# Patient Record
Sex: FEMALE | Race: WHITE | NOT HISPANIC OR LATINO | Employment: PART TIME | URBAN - NONMETROPOLITAN AREA
[De-identification: names, ages, dates, MRNs, and addresses within clinical notes are randomized per-mention and may not be internally consistent; named-entity substitution may affect disease eponyms.]

---
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
## 2022-11-04 ENCOUNTER — OFFICE VISIT (OUTPATIENT)
Dept: OBSTETRICS AND GYNECOLOGY | Facility: CLINIC | Age: 22
End: 2022-11-04

## 2022-11-04 VITALS
HEIGHT: 60 IN | WEIGHT: 98.4 LBS | DIASTOLIC BLOOD PRESSURE: 58 MMHG | BODY MASS INDEX: 19.32 KG/M2 | SYSTOLIC BLOOD PRESSURE: 110 MMHG

## 2022-11-04 DIAGNOSIS — N94.6 DYSMENORRHEA: Primary | ICD-10-CM

## 2022-11-04 DIAGNOSIS — N83.8 OVARIAN MASS, LEFT: ICD-10-CM

## 2022-11-04 PROCEDURE — 99213 OFFICE O/P EST LOW 20 MIN: CPT | Performed by: PHYSICIAN ASSISTANT

## 2022-11-04 NOTE — PROGRESS NOTES
Subjective   Chief Complaint   Patient presents with   • Menstrual Problem     Patient complains of severe pain during menstrual cycle and thinks she could have ovarian cysts. Patient states they found a benign left ovarian mass.       Sugar Sequeira is a 22 y.o. year old  presenting to be seen for dysmenorrhea and left ovarian mass  Patient had scheduled appt here to be seen for dysmenorrhea but subsequently was seen at [facility] and had CT scan done for pelvic pain. CT scan showed left adnexa mass and then transvaginal pelvic ultrasound done 10- noted a 7 cm left ovarian mass. Report states mass is not simple but looks benign. Patient has copy of report on her phone which is reviewed.   She is not currently sexually active. She stopped birth control patch several months ago.  Cycles are regular q 28 days with 4 day flow but cycles very painful. Does not experience pelvic pain except when on menses.   Since spring of 2021 menses especially painful. Has issues with chronic constipation also and has been on Linzess for 2 weeks  LMP 10/15/2022       History reviewed. No pertinent past medical history.   No current outpatient medications on file.   No Known Allergies   History reviewed. No pertinent surgical history.   Social History     Socioeconomic History   • Marital status: Single   Tobacco Use   • Smoking status: Never   • Smokeless tobacco: Never   Substance and Sexual Activity   • Alcohol use: No   • Drug use: No   • Sexual activity: Not Currently     Partners: Male      Family History   Problem Relation Age of Onset   • No Known Problems Father    • No Known Problems Mother        Review of Systems   Constitutional: Negative for chills, diaphoresis and fever.   Gastrointestinal: Negative for constipation, diarrhea, nausea and vomiting.   Genitourinary: Positive for menstrual problem and pelvic pain. Negative for difficulty urinating and dysuria.           Objective   /58   Ht  "152.4 cm (60\")   Wt 44.6 kg (98 lb 6.4 oz)   LMP 10/15/2022 Comment: States their period lasted 4 days with severe pain  BMI 19.22 kg/m²     Physical Exam  Constitutional:       Appearance: Normal appearance. She is well-developed and well-groomed.   Eyes:      General: Lids are normal.      Extraocular Movements: Extraocular movements intact.      Conjunctiva/sclera: Conjunctivae normal.   Skin:     General: Skin is warm and dry.      Findings: No bruising or lesion.   Neurological:      General: No focal deficit present.      Mental Status: She is alert and oriented to person, place, and time.   Psychiatric:         Attention and Perception: Attention normal.         Mood and Affect: Mood normal.         Speech: Speech normal.         Behavior: Behavior is cooperative.            Result Review :                   Assessment and Plan  Diagnoses and all orders for this visit:    1. Dysmenorrhea (Primary)    2. Ovarian mass, left      Patient Instructions   Follow up 2-3 weeks to repeat TVS here as want to allow a little time to see if cyst changing. Pending follow up ultrasound will discuss further management but if cyst persistent and complex may need surgical removal.               This note was electronically signed.    Kavya Antonio PA-C   November 4, 2022  "

## 2022-11-04 NOTE — PATIENT INSTRUCTIONS
Follow up 2-3 weeks to repeat TVS here as want to allow a little time to see if cyst changing. Pending follow up ultrasound will discuss further management but if cyst persistent and complex may need surgical removal.

## 2022-12-07 ENCOUNTER — HOSPITAL ENCOUNTER (OUTPATIENT)
Facility: HOSPITAL | Age: 22
Setting detail: HOSPITAL OUTPATIENT SURGERY
End: 2022-12-07
Attending: OBSTETRICS & GYNECOLOGY | Admitting: OBSTETRICS & GYNECOLOGY

## 2022-12-07 ENCOUNTER — PREP FOR SURGERY (OUTPATIENT)
Dept: OTHER | Facility: HOSPITAL | Age: 22
End: 2022-12-07

## 2022-12-07 ENCOUNTER — DOCUMENTATION (OUTPATIENT)
Dept: OBSTETRICS AND GYNECOLOGY | Facility: CLINIC | Age: 22
End: 2022-12-07

## 2022-12-07 DIAGNOSIS — R19.00 PELVIC MASS: Primary | ICD-10-CM

## 2022-12-07 RX ORDER — SODIUM CHLORIDE 0.9 % (FLUSH) 0.9 %
3 SYRINGE (ML) INJECTION EVERY 12 HOURS SCHEDULED
Status: CANCELLED | OUTPATIENT
Start: 2022-12-07

## 2022-12-07 RX ORDER — SODIUM CHLORIDE 9 MG/ML
40 INJECTION, SOLUTION INTRAVENOUS AS NEEDED
Status: CANCELLED | OUTPATIENT
Start: 2022-12-07

## 2022-12-07 RX ORDER — SODIUM CHLORIDE 0.9 % (FLUSH) 0.9 %
10 SYRINGE (ML) INJECTION AS NEEDED
Status: CANCELLED | OUTPATIENT
Start: 2022-12-07

## 2022-12-07 NOTE — PROGRESS NOTES
Patient is informed of results of pelvic ultrasound done yesterday.  She has a persistent 7.8 cm complex cystic area appears to be filled with thick echogenic fluid.  This is slightly larger than what was seen on previous CT scan done in October at [facility].  Mass at that time measured 6.3 x 4.5 x 4.2 cm.  It appeared to be in the left hemipelvis.  Pelvic ultrasound done October 18 at [facility] had noted a 7 cm left ovarian mass.  On today's ultrasound the sonographer could not determine for sure which side this originated from.  Bilateral ovaries did have follicles and vascular flow.  There was a small amount of free fluid.  Patient reports that she is not having any significant pelvic pain however she is having issues with bowel movements.  She has seen gastroenterologist recently who feels that her bowel issues are likely related to the pelvic mass.  Recommend diagnostic laparoscopy and removal of mass for definitive diagnosis.  She is advised that there could possibly be removal of ovary but will be as conservative as possible.  Patient voices understanding/agreement.

## 2022-12-13 ENCOUNTER — TELEPHONE (OUTPATIENT)
Dept: OBSTETRICS AND GYNECOLOGY | Facility: CLINIC | Age: 22
End: 2022-12-13

## 2022-12-13 NOTE — TELEPHONE ENCOUNTER
Caller: Sugar Sequeira    Relationship to patient: Self    Best call back number: [phone]    Chief complaint: ADBEXAL MASS    Type of visit: SURGERY    Requested date: 1ST AVAILABLE     If rescheduling, when is the original appointment: 1/3/23    Additional notes:PT CALLED TO SEE IF SURGERY DATE COULD BE MOVED UP, SHE IS IN A LOT OF PAIN.  PLS CALL TO DISCUSS

## 2022-12-20 ENCOUNTER — TELEPHONE (OUTPATIENT)
Dept: PREADMISSION TESTING | Facility: HOSPITAL | Age: 22
End: 2022-12-20

## 2022-12-22 ENCOUNTER — PRE-ADMISSION TESTING (OUTPATIENT)
Dept: PREADMISSION TESTING | Facility: HOSPITAL | Age: 22
End: 2022-12-22

## 2022-12-22 VITALS — BODY MASS INDEX: 19.91 KG/M2 | WEIGHT: 101.4 LBS | HEIGHT: 60 IN

## 2022-12-22 DIAGNOSIS — R19.00 PELVIC MASS: ICD-10-CM

## 2022-12-22 LAB
BASOPHILS # BLD AUTO: 0.05 10*3/MM3 (ref 0–0.2)
BASOPHILS NFR BLD AUTO: 0.9 % (ref 0–1.5)
BILIRUB UR QL STRIP: NEGATIVE
CLARITY UR: CLEAR
COLOR UR: YELLOW
DEPRECATED RDW RBC AUTO: 42 FL (ref 37–54)
EOSINOPHIL # BLD AUTO: 0.19 10*3/MM3 (ref 0–0.4)
EOSINOPHIL NFR BLD AUTO: 3.6 % (ref 0.3–6.2)
ERYTHROCYTE [DISTWIDTH] IN BLOOD BY AUTOMATED COUNT: 13.2 % (ref 12.3–15.4)
GLUCOSE UR STRIP-MCNC: NEGATIVE MG/DL
HCT VFR BLD AUTO: 38.8 % (ref 34–46.6)
HGB BLD-MCNC: 12.6 G/DL (ref 12–15.9)
HGB UR QL STRIP.AUTO: NEGATIVE
IMM GRANULOCYTES # BLD AUTO: 0.01 10*3/MM3 (ref 0–0.05)
IMM GRANULOCYTES NFR BLD AUTO: 0.2 % (ref 0–0.5)
KETONES UR QL STRIP: NEGATIVE
LEUKOCYTE ESTERASE UR QL STRIP.AUTO: NEGATIVE
LYMPHOCYTES # BLD AUTO: 2.48 10*3/MM3 (ref 0.7–3.1)
LYMPHOCYTES NFR BLD AUTO: 46.8 % (ref 19.6–45.3)
MCH RBC QN AUTO: 28.2 PG (ref 26.6–33)
MCHC RBC AUTO-ENTMCNC: 32.5 G/DL (ref 31.5–35.7)
MCV RBC AUTO: 86.8 FL (ref 79–97)
MONOCYTES # BLD AUTO: 0.34 10*3/MM3 (ref 0.1–0.9)
MONOCYTES NFR BLD AUTO: 6.4 % (ref 5–12)
NEUTROPHILS NFR BLD AUTO: 2.23 10*3/MM3 (ref 1.7–7)
NEUTROPHILS NFR BLD AUTO: 42.1 % (ref 42.7–76)
NITRITE UR QL STRIP: NEGATIVE
NRBC BLD AUTO-RTO: 0 /100 WBC (ref 0–0.2)
PH UR STRIP.AUTO: 6.5 [PH] (ref 5–8)
PLATELET # BLD AUTO: 325 10*3/MM3 (ref 140–450)
PMV BLD AUTO: 9.2 FL (ref 6–12)
PROT UR QL STRIP: NEGATIVE
RBC # BLD AUTO: 4.47 10*6/MM3 (ref 3.77–5.28)
SP GR UR STRIP: 1.02 (ref 1–1.03)
UROBILINOGEN UR QL STRIP: NORMAL
WBC NRBC COR # BLD: 5.3 10*3/MM3 (ref 3.4–10.8)

## 2022-12-22 PROCEDURE — 36415 COLL VENOUS BLD VENIPUNCTURE: CPT

## 2022-12-22 PROCEDURE — 81003 URINALYSIS AUTO W/O SCOPE: CPT

## 2022-12-22 PROCEDURE — 85025 COMPLETE CBC W/AUTO DIFF WBC: CPT

## 2022-12-27 ENCOUNTER — TELEPHONE (OUTPATIENT)
Dept: OBSTETRICS AND GYNECOLOGY | Facility: CLINIC | Age: 22
End: 2022-12-27

## 2022-12-27 NOTE — TELEPHONE ENCOUNTER
Caller: Sugar Seqeuira    Relationship: Self    Best call back number: [phone]    What form or medical record are you requesting: MEDICAL RECORDS     Who is requesting this form or medical record from you:     How would you like to receive the form or medical records (pick-up, mail, fax):   If fax, what is the fax number: [phone]    Timeframe paperwork needed: AS SOON AS POSSIBLE    Additional notes: PT IS TRANSFERRING CARE TO  OFFICE

## 2022-12-27 NOTE — TELEPHONE ENCOUNTER
Called patient to inform her that she needs to come on and sign a release of records form. No answer or voice mail set up.

## 2023-01-05 ENCOUNTER — TELEPHONE (OUTPATIENT)
Dept: OBSTETRICS AND GYNECOLOGY | Facility: CLINIC | Age: 23
End: 2023-01-05

## 2023-01-05 ENCOUNTER — OFFICE VISIT (OUTPATIENT)
Dept: OBSTETRICS AND GYNECOLOGY | Facility: CLINIC | Age: 23
End: 2023-01-05
Payer: COMMERCIAL

## 2023-01-05 VITALS
RESPIRATION RATE: 14 BRPM | BODY MASS INDEX: 20.12 KG/M2 | WEIGHT: 103 LBS | SYSTOLIC BLOOD PRESSURE: 112 MMHG | DIASTOLIC BLOOD PRESSURE: 64 MMHG

## 2023-01-05 DIAGNOSIS — N94.6 DYSMENORRHEA: ICD-10-CM

## 2023-01-05 DIAGNOSIS — R10.31 RLQ ABDOMINAL PAIN: ICD-10-CM

## 2023-01-05 DIAGNOSIS — R93.5 ABNORMAL ULTRASOUND OF OVARY: Primary | ICD-10-CM

## 2023-01-05 PROBLEM — Z01.419 WELL WOMAN EXAM: Status: ACTIVE | Noted: 2023-01-05

## 2023-01-05 PROCEDURE — 99214 OFFICE O/P EST MOD 30 MIN: CPT | Performed by: OBSTETRICS & GYNECOLOGY

## 2023-01-05 RX ORDER — RELUGOLIX, ESTRADIOL HEMIHYDRATE, AND NORETHINDRONE ACETATE 40; 1; .5 MG/1; MG/1; MG/1
1 TABLET, FILM COATED ORAL DAILY
Qty: 84 TABLET | Refills: 4 | Status: SHIPPED | OUTPATIENT
Start: 2023-01-05

## 2023-01-05 NOTE — PROGRESS NOTES
Subjective   Chief Complaint   Patient presents with   • Gynecologic Exam       Sugar Sequeira is a 22 y.o. year old .  Patient's last menstrual period was 2022 (approximate).  She comes in as a new patient transferring care from [facility].  She comes in related to a cystic pelvic mass seen at outside imaging.  In early October she was having issues with pain and constipation.  CAT scan was done showing moderate stool burden as well as a cystic adnexal mass.  She is had subsequent ultrasounds done showing persistence of this mass.  Her most recent ultrasound was in early December showing about a 7 cm mass highly suspicious for an endometrioma.  She had no hormonal treatment yet attempted to resolve this.    Pain used to be more left-sided than right but now her right is the only side it really hurts.  Her constipation does seem to be improving with time    She is able to work and go to school other than when she is on her cycle.  Her pain is debilitating and unless she takes pain medicine she cannot do much with that.    The following portions of the patient's history were reviewed and updated as appropriate:current medications, allergies, past family history, past medical history, past social history and past surgical history    Social History    Tobacco Use      Smoking status: Never      Smokeless tobacco: Never         Objective   /64   Resp 14   Wt 46.7 kg (103 lb)   LMP 2022 (Approximate)   BMI 20.12 kg/m²     Lab Review   See HPI    Imaging   See HPI       Assessment   1. Suspected right ovarian endometrioma with pelvic pain and dysmenorrhea  2. History of constipation suspected aggravated by anticipated ovarian endometrioma/endometriosis     Plan   1. For now would like to try avoiding surgery and see if we can help resolve the symptom with empiric treatment is endometriosis  2. The importance of keeping all planned follow-up and taking all medications as prescribed  was emphasized.  3. Follow up about 8 weeks after starting therapy to see if we are symptomatically improving  4. Explained that this could take at least 6 months of ongoing therapy for measurable difference in her endometrioma sonographically    New Medications Ordered This Visit   Medications   • Myfembree 40-1-0.5 MG tablet     Sig: Take 1 tablet by mouth Daily.     Dispense:  84 tablet     Refill:  4          This note was electronically signed.    Carlos Mckenzie M.D.  January 5, 2023    Total time spent today with Sugar  was 45-59 minutes (level 4) - pathophysiology of her presenting problem along with plans for any diagnositc work-up and treatment.    Part of this note may be an electronic transcription/translation of spoken language to printed text using the Dragon Dictation System.

## 2023-01-05 NOTE — TELEPHONE ENCOUNTER
Provider: DR. DE LA CRUZ    Caller: JAQUELINE CALDWELL    Relationship to Patient: SELF    Phone Number: [phone]    Reason for Call: PATIENT WAS SEEN TODAY 1/5/23. SHE STATED THAT  DR. DE LA CRUZ TOLD HER SHE HAS ENDOMETRIOSIS. SHE WOULD LIKE TO KNOW WITH THE DIAGNOSIS WILL SHE HAVE FERTILITY ISSUES IN THE FUTURE. IF SO, SHE WOULD LIKE TO KNOW THE STEPS TO TAKE NOW TO PREVENT INFERTILITY. PATIENT CAN BE CALLED [phone]    When was the patient last seen: 1/5/23

## 2023-01-06 NOTE — TELEPHONE ENCOUNTER
There is no way to know for certain.  Surgery does not necessarily improve the chances.  Medication does not necessarily improve the chances.  If she does have endometriosis there is definitely an increased likelihood of fertility issues but at this point the most important thing to consider is control of her symptoms.  I still think going the direction of medication is her best option.

## 2023-04-18 ENCOUNTER — OFFICE VISIT (OUTPATIENT)
Dept: OBSTETRICS AND GYNECOLOGY | Facility: CLINIC | Age: 23
End: 2023-04-18
Payer: COMMERCIAL

## 2023-04-18 VITALS — BODY MASS INDEX: 19.33 KG/M2 | WEIGHT: 99 LBS | RESPIRATION RATE: 14 BRPM

## 2023-04-18 DIAGNOSIS — N83.8 OVARIAN MASS, LEFT: ICD-10-CM

## 2023-04-18 DIAGNOSIS — R93.5 ABNORMAL ULTRASOUND OF OVARY: Primary | ICD-10-CM

## 2023-04-18 DIAGNOSIS — R10.31 RLQ ABDOMINAL PAIN: ICD-10-CM

## 2023-04-18 DIAGNOSIS — N94.6 DYSMENORRHEA: ICD-10-CM

## 2023-04-18 PROCEDURE — 99213 OFFICE O/P EST LOW 20 MIN: CPT | Performed by: OBSTETRICS & GYNECOLOGY

## 2023-04-18 NOTE — PROGRESS NOTES
Subjective   Chief Complaint   Patient presents with   • Medication Reaction       Sugar Sequeira is a 22 y.o. year old .  Patient's last menstrual period was 2023 (approximate).  She comes in follow-up.  She is using the Myfembree and feels much better.  Almost no pain anymore.  The constipation is better as well.  Having no menses or spotting.  No side effects with the medication    The following portions of the patient's history were reviewed and updated as appropriate:current medications and allergies    Social History    Tobacco Use      Smoking status: Never      Smokeless tobacco: Never         Objective   Resp 14   Wt 44.9 kg (99 lb)   LMP 2023 (Approximate)   BMI 19.33 kg/m²     Lab Review   No data reviewed    Imaging   No data reviewed        Assessment   1. Pelvic pain with presumed endometrioma - better with medication     Plan   1. Continue same plan  2. Recheck U/S in ~ 3-4 months  3. The importance of keeping all planned follow-up and taking all medications as prescribed was emphasized.  4. Follow up at the time of f/u U/S           This note was electronically signed.    Carlos Mckenzie M.D.  2023    Total time spent today with Sugar  was 20-29 minutes (level 3) - pathophysiology of her presenting problem along with plans for any diagnositc work-up and treatment.    Part of this note may be an electronic transcription/translation of spoken language to printed text using the Dragon Dictation System.

## 2023-07-18 PROBLEM — N80.129 ENDOMETRIOMA OF OVARY: Status: ACTIVE | Noted: 2022-12-07

## 2023-07-24 ENCOUNTER — TELEPHONE (OUTPATIENT)
Dept: OBSTETRICS AND GYNECOLOGY | Facility: CLINIC | Age: 23
End: 2023-07-24
Payer: COMMERCIAL

## 2023-07-24 NOTE — TELEPHONE ENCOUNTER
DOROTHY    Pt states on Friday night after having Sex she had extreme LLQ pain . Pt took Ibuprofen and after 40 min pain eased up. States since then she has had sex that afterwards that has still been causing her pain but it is not as painful

## 2023-07-24 NOTE — TELEPHONE ENCOUNTER
Patient is not pregnant.  Pain this weekend.  No pain with IC until this weekend.  Patient is on myfembree.   Patient has endometriosis.  LMP no menses since starting Myfembree.

## 2023-07-25 NOTE — TELEPHONE ENCOUNTER
Would let her know that as not an uncommon symptom with endometriosis.  It may take time for this to get better.  It may not get better at all with the Myfembree therapy but will take some time to see.

## 2023-10-19 ENCOUNTER — OFFICE VISIT (OUTPATIENT)
Dept: OBSTETRICS AND GYNECOLOGY | Facility: CLINIC | Age: 23
End: 2023-10-19
Payer: COMMERCIAL

## 2023-10-19 VITALS
DIASTOLIC BLOOD PRESSURE: 64 MMHG | BODY MASS INDEX: 19.73 KG/M2 | WEIGHT: 101 LBS | SYSTOLIC BLOOD PRESSURE: 104 MMHG | RESPIRATION RATE: 14 BRPM

## 2023-10-19 DIAGNOSIS — N80.129 ENDOMETRIOMA OF OVARY: Primary | ICD-10-CM

## 2023-10-19 PROCEDURE — 99214 OFFICE O/P EST MOD 30 MIN: CPT | Performed by: OBSTETRICS & GYNECOLOGY

## 2023-10-19 NOTE — PROGRESS NOTES
Subjective   Chief Complaint   Patient presents with    Follow-up     Us today, f/u endometriomas     Sugar Sequeira is a 23 y.o. year old  who comes to review her recent testing and discuss next steps.  She remains on Myfembree.  She is having very few symptoms at all.  She is having no menstrual cycles.  Ultrasound today shows persistence of the bilateral ovarian cysts, sonographically most consistent with endometriomas.  The size of the cyst is no greater on either right or left side and may be even slightly smaller.    She is taking a prenatal vitamin already.  They are contemplating getting pregnant and would like to know when that might be safe         Objective   /64 (BP Location: Right arm, Patient Position: Sitting, Cuff Size: Adult)   Resp 14   Wt 45.8 kg (101 lb)   LMP 01/15/2023 (Within Weeks) Comment: Sometime in 2023  BMI 19.73 kg/m²     Lab Review   No data reviewed    Imaging   See HPI       Assessment   Asymptomatic bilateral abnormal adnexa most consistent with bilateral ovarian endometriomas.  She is on Myfembree and is doing well with that  Preconceptional on folic acid     Plan   Continue with Myfembree  Recheck by ultrasound in 3 to 4 months time unless symptoms worsen  If she chooses to get off the Myfembree in an effort to get pregnant I would support that.  If she has not conceived within 6 to 9 months off Myfembree I think we can readdress whether surgical intervention may be needed  The use of condoms for pregnancy prevention was emphasized as long as she is taking Myfembree.   The importance of keeping all planned follow-up and taking all medications as prescribed was emphasized.  Have also reviewed that should she get pregnant she is at increased risk for ectopic pregnancies.  As such if she were to test positive for pregnancy we would need to see her soon to confirm intrauterine location  Follow up PRN             This note was electronically signed.    Carlos  CHANDLER Mckenzie M.D.  October 19, 2023    Part of this note may be an electronic transcription/translation of spoken language to printed text using the Dragon Dictation System.

## 2023-10-24 ENCOUNTER — TELEPHONE (OUTPATIENT)
Dept: OBSTETRICS AND GYNECOLOGY | Facility: CLINIC | Age: 23
End: 2023-10-24
Payer: COMMERCIAL

## 2023-10-24 NOTE — TELEPHONE ENCOUNTER
Caller: Sugar Sequeira    Relationship: Self    Best call back number: [phone]    What form or medical record are you requesting: NOTE FOR SCHOOL     Who is requesting this form or medical record from you: SCHOOL     How would you like to receive the form or medical records (pick-up, mail, fax): UPLOAD TO [employer]    Additional notes: PATIENT SAW DR DE LA CRUZ 10/19/23

## 2023-10-24 NOTE — TELEPHONE ENCOUNTER
Letter created and sent via [employer], called patient and LVM letting her know that the letter is available on [employer].

## 2024-02-19 ENCOUNTER — OFFICE VISIT (OUTPATIENT)
Dept: OBSTETRICS AND GYNECOLOGY | Facility: CLINIC | Age: 24
End: 2024-02-19
Payer: COMMERCIAL

## 2024-02-19 VITALS — RESPIRATION RATE: 14 BRPM | WEIGHT: 105 LBS | BODY MASS INDEX: 20.51 KG/M2

## 2024-02-19 DIAGNOSIS — N80.129 ENDOMETRIOMA OF OVARY: Primary | ICD-10-CM

## 2024-02-19 DIAGNOSIS — N94.6 DYSMENORRHEA: ICD-10-CM

## 2024-02-19 PROCEDURE — 99213 OFFICE O/P EST LOW 20 MIN: CPT | Performed by: OBSTETRICS & GYNECOLOGY

## 2024-02-19 RX ORDER — PNV NO.95/FERROUS FUM/FOLIC AC 28MG-0.8MG
1 TABLET ORAL DAILY
Start: 2024-02-19

## 2024-02-19 NOTE — PROGRESS NOTES
Subjective   Chief Complaint   Patient presents with    Follow-up       Sugar Sequeira is a 23 y.o. year old .  Patient's last menstrual period was 02/15/2024 (approximate).  She comes in follow-up.  She has been off Myfembree for about 3 months time now.  They are trying to conceive.  She is taking a prenatal vitamin.  Since coming off the Myfembree her cycles are progressively getting worse.  She is having no pain at all between her menses.  Her pain is able to be managed still with ibuprofen.    The following portions of the patient's history were reviewed and updated as appropriate:current medications and allergies    Social History    Tobacco Use      Smoking status: Never      Smokeless tobacco: Never         Objective   Resp 14   Wt 47.6 kg (105 lb)   LMP 02/15/2024 (Approximate)   BMI 20.51 kg/m²     Lab Review   No data reviewed    Imaging   Pelvic ultrasound images independantly reviewed -bilateral ovarian endometriomas persist       Assessment   Pre-conceptional - she currently is taking sufficient folic acid  Bilateral ovarian endometriomas sonographically stable over time.  Dysmenorrhea not yet impacting day-to-day function     Plan   Have explained definitions of infertility and if she has been trying to conceive for 12 months without success this might need to be addressed.  If pain becomes a significant issue we might need to talk about surgical intervention prior to 12 months  Ectopic warnings reviewed  The importance of keeping all planned follow-up and taking all medications as prescribed was emphasized.  Follow up PRN             This note was electronically signed.    Carlos Mckenzie M.D.  2024    Total time spent today with Sugar  was 20 minutes (level 3) - pathophysiology of her presenting problem along with plans for any diagnositc work-up and treatment.  The time reported only includes face-to-face time and excludes any procedural based activities that occurred  on the same date of service.    Part of this note may be an electronic transcription/translation of spoken language to printed text using the Dragon Dictation System.

## 2024-04-02 ENCOUNTER — PREP FOR SURGERY (OUTPATIENT)
Dept: OTHER | Facility: HOSPITAL | Age: 24
End: 2024-04-02
Payer: COMMERCIAL

## 2024-04-02 ENCOUNTER — OFFICE VISIT (OUTPATIENT)
Dept: OBSTETRICS AND GYNECOLOGY | Facility: CLINIC | Age: 24
End: 2024-04-02
Payer: COMMERCIAL

## 2024-04-02 VITALS — WEIGHT: 104 LBS | BODY MASS INDEX: 20.31 KG/M2 | RESPIRATION RATE: 14 BRPM

## 2024-04-02 DIAGNOSIS — N80.129 ENDOMETRIOMA OF OVARY: Primary | ICD-10-CM

## 2024-04-02 DIAGNOSIS — R10.31 PAIN, ABDOMINAL, RLQ: ICD-10-CM

## 2024-04-02 PROCEDURE — 99214 OFFICE O/P EST MOD 30 MIN: CPT | Performed by: OBSTETRICS & GYNECOLOGY

## 2024-04-02 NOTE — PROGRESS NOTES
Subjective   Chief Complaint   Patient presents with    Follow-up       Sugar Sequeira is a 23 y.o. year old .  No LMP recorded.  She comes accompanied by her mother-in-law to discuss some new symptoms that have developed.  She has been having worsening pelvic pain since her last menstrual cycle.  Her pain is in the pelvis bilaterally but right is much worse than left.  Because of this pain she was seen at [facility]'s emergency room where a CAT scan was done.  She comes with a copy of the CAT scan report along with a disc.  She has got enlargement of the cystic adnexal lesions previously seen sonographically.    Historically she did take Myfembree in the past for about 9 months time and did well with no real problems from that.    The following portions of the patient's history were reviewed and updated as appropriate:current medications and allergies    Social History    Tobacco Use      Smoking status: Never      Smokeless tobacco: Never         Objective   Resp 14   Wt 47.2 kg (104 lb)   BMI 20.31 kg/m²     Lab Review   No data reviewed    Imaging   CT of abdomen/pelvis report        Assessment   Pelvic pain worsening with probable interval progression of what appears to be bilateral ovarian endometriomas that were historically not causing her problems but appear now to be growing to the point of pain     Plan   At this point I do think now some kind of therapy is necessary.  Will with the we empirically begin with medical therapy such as Orilissa/Myfembree or proceed with laparoscopic evaluation I think it is debatable.  Given that she has had a suboptimal response in the past to use of months of Myfembree I think using either Orilissa or Myfembree is unlikely to make significant difference and for that reason I think proceeding with laparoscopic evaluation with probable drainage of bilateral ovarian endometriomas, possible laser of residual disease with postoperative use of Myfembree or Orilissa would  be the most prudent course of action   Ultimately I think she is going to need laparoscopic evaluation with drainage of endometriomas followed by extended course therapy and 6 months later repeat laparoscopy to try to clean up the pelvis as best I can assuming endometriosis is in fact encountered  Surgical risks including injury to the adjacent organs including the ureter, bowel or bladder were discussed  As long as she can continue to use ibuprofen scheduled I think it hold off on narcotic pain medicine for the time being         This note was electronically signed.    Carlos Mckenzie M.D.  April 2, 2024    Total time spent today with Sugar  was 30 minutes (level 4) - pathophysiology of her presenting problem along with plans for any diagnositc work-up and treatment.  The time reported only includes face-to-face time and excludes any procedural based activities that occurred on the same date of service.    Part of this note may be an electronic transcription/translation of spoken language to printed text using the Dragon Dictation System.

## 2024-04-14 ENCOUNTER — PREP FOR SURGERY (OUTPATIENT)
Dept: OTHER | Facility: HOSPITAL | Age: 24
End: 2024-04-14
Payer: COMMERCIAL

## 2024-04-14 NOTE — H&P
Sugar Sequeira  : 2000  MRN: [id number]  CSN: [id number]    History and Physical    Subjective   Sugar Sequeira is a 23 y.o. year old  who present for pelvic pain with suspected bilateral ovarian endometriomas by ultrasound.    No past medical history on file.  Past Surgical History:   Procedure Laterality Date    WISDOM TOOTH EXTRACTION  2016    INCISIONAL BREAST BIOPSY Right 10/2022     OB History    Para Term  AB Living   0 0 0 0 0 0   SAB IAB Ectopic Molar Multiple Live Births   0 0 0 0 0 0     Social History    Tobacco Use      Smoking status: Never      Smokeless tobacco: Never      Current Outpatient Medications:     Prenatal Vit-Fe Fumarate-FA (Prenatal Vitamin) 27-0.8 MG tablet, Take 1 tablet by mouth Daily., Disp: , Rfl:     No Known Allergies    Review of Systems   Constitutional:  Negative for chills, fever and unexpected weight change.   HENT:  Negative for ear pain, facial swelling, sinus pressure, sneezing and sore throat.    Respiratory:  Negative for cough, shortness of breath and wheezing.    Cardiovascular:  Negative for chest pain and palpitations.   Hematological:  Does not bruise/bleed easily.         Objective     Vital Signs: See nursing documentation   General: well developed; well nourished  no acute distress   Mental status: Alert and oriented   Heart: Not performed.   Lungs: breathing is unlabored   Abdomen: soft, non-tender; no masses  no umbilical or inguinal hernias are present  no hepato-splenomegaly   Pelvis: Not performed.        Assessment   Pelvic pain with suspected bilateral ovarian endometriomas     Plan   Laparoscopic evaluation with anticipated drainage of ovarian endometriomas, CO2 laser of endometriosis if present  Patient aware of findings as anticipated are present, she will need postoperative adjuvant therapy with GnRH agonists followed by anticipated repeat laparoscopy at interval time.  I have previously discussed with Sugar the risks of  her surgical procedure. Risks including intraoperative bleeding, infection at the site of surgery, damage to the adjacent surrounding organs, catheter induced urinary tract infections and the small risk for deep vein thrombosis have all been explained. Additionally, the small risk for reoperation in the event of unanticipated bleeding or surgical injury have been discussed.        Carlos Mckenzie MD       (Pt's PCP is Arely Gandhi APRN)

## 2024-04-17 ENCOUNTER — LAB REQUISITION (OUTPATIENT)
Dept: LAB | Facility: HOSPITAL | Age: 24
End: 2024-04-17
Payer: COMMERCIAL

## 2024-04-17 ENCOUNTER — SPECIALTY PHARMACY (OUTPATIENT)
Dept: PHARMACY | Facility: TELEHEALTH | Age: 24
End: 2024-04-17
Payer: COMMERCIAL

## 2024-04-17 ENCOUNTER — OUTSIDE FACILITY SERVICE (OUTPATIENT)
Dept: OBSTETRICS AND GYNECOLOGY | Facility: CLINIC | Age: 24
End: 2024-04-17
Payer: COMMERCIAL

## 2024-04-17 DIAGNOSIS — Z98.890 POST-OPERATIVE STATE: Primary | ICD-10-CM

## 2024-04-17 DIAGNOSIS — N80.219: ICD-10-CM

## 2024-04-17 PROBLEM — N80.129 ENDOMETRIOMA OF OVARY: Status: RESOLVED | Noted: 2022-12-07 | Resolved: 2024-04-17

## 2024-04-17 PROCEDURE — 88305 TISSUE EXAM BY PATHOLOGIST: CPT | Performed by: OBSTETRICS & GYNECOLOGY

## 2024-04-17 RX ORDER — LEUPROLIDE ACETATE 11.25 MG
11.25 KIT INTRAMUSCULAR
Qty: 1 EACH | Refills: 1 | Status: SHIPPED | OUTPATIENT
Start: 2024-04-17 | End: 2024-10-14

## 2024-04-17 RX ORDER — HYDROCODONE BITARTRATE AND ACETAMINOPHEN 5; 325 MG/1; MG/1
1 TABLET ORAL EVERY 6 HOURS PRN
Qty: 24 TABLET | Refills: 0 | Status: SHIPPED | OUTPATIENT
Start: 2024-04-17 | End: 2024-04-23

## 2024-04-18 LAB
CYTO UR: NORMAL
LAB AP CASE REPORT: NORMAL
LAB AP CLINICAL INFORMATION: NORMAL
PATH REPORT.FINAL DX SPEC: NORMAL
PATH REPORT.GROSS SPEC: NORMAL

## 2024-04-19 ENCOUNTER — TELEPHONE (OUTPATIENT)
Dept: OBSTETRICS AND GYNECOLOGY | Facility: CLINIC | Age: 24
End: 2024-04-19
Payer: COMMERCIAL

## 2024-04-19 NOTE — TELEPHONE ENCOUNTER
We are going to switch over and use injectable Depo-Lupron instead.  When she comes for her postoperative visit she should be able to stop by [facility] downstairs and pick it up.  Will use this in place of the Myfembree

## 2024-04-19 NOTE — TELEPHONE ENCOUNTER
DOROTHY    Patient called stating she is looking to have medication refill for Myfembree sent to pharmacy as listed in chart and confirmed by patient shown below:    [id number] - [address] - [phone] [facility] [phone] FX     Patient stated she started taking medication prior to surgery, but stopped for 6 months and is supposed to start taking again after surgery. Please advise.

## 2024-04-22 NOTE — TELEPHONE ENCOUNTER
I spoke to Sugar and gave her Dr Mckenzie  response and she is aware to stop at  [facility] pharmacy downstairs and pick it up injectable Depo-Lupron.

## 2024-05-06 ENCOUNTER — OFFICE VISIT (OUTPATIENT)
Dept: OBSTETRICS AND GYNECOLOGY | Facility: CLINIC | Age: 24
End: 2024-05-06
Payer: COMMERCIAL

## 2024-05-06 VITALS
WEIGHT: 99 LBS | RESPIRATION RATE: 14 BRPM | SYSTOLIC BLOOD PRESSURE: 110 MMHG | DIASTOLIC BLOOD PRESSURE: 68 MMHG | BODY MASS INDEX: 19.33 KG/M2

## 2024-05-06 DIAGNOSIS — Z98.890 POST-OPERATIVE STATE: Primary | ICD-10-CM

## 2024-05-06 PROCEDURE — 99024 POSTOP FOLLOW-UP VISIT: CPT | Performed by: OBSTETRICS & GYNECOLOGY

## 2024-05-06 PROCEDURE — 96372 THER/PROPH/DIAG INJ SC/IM: CPT | Performed by: OBSTETRICS & GYNECOLOGY

## 2024-08-01 ENCOUNTER — SPECIALTY PHARMACY (OUTPATIENT)
Dept: PHARMACY | Facility: HOSPITAL | Age: 24
End: 2024-08-01
Payer: COMMERCIAL

## 2024-08-02 ENCOUNTER — CLINICAL SUPPORT (OUTPATIENT)
Dept: OBSTETRICS AND GYNECOLOGY | Facility: CLINIC | Age: 24
End: 2024-08-02
Payer: COMMERCIAL

## 2024-08-02 DIAGNOSIS — N92.1 MENORRHAGIA WITH IRREGULAR CYCLE: Primary | ICD-10-CM

## 2024-08-15 ENCOUNTER — TELEPHONE (OUTPATIENT)
Dept: OBSTETRICS AND GYNECOLOGY | Facility: CLINIC | Age: 24
End: 2024-08-15
Payer: COMMERCIAL

## 2024-08-15 NOTE — TELEPHONE ENCOUNTER
Called and spoke with patient, she states she has noticed a new raised labial bump in the last 2-3 weeks.  It is painful and has made intercourse too painful to bear at this time.  Patient has been scheduled with ABIGAIL Murguia to try to get her in a more timely manner.

## 2024-08-19 ENCOUNTER — OFFICE VISIT (OUTPATIENT)
Dept: OBSTETRICS AND GYNECOLOGY | Facility: CLINIC | Age: 24
End: 2024-08-19
Payer: COMMERCIAL

## 2024-08-19 ENCOUNTER — TELEPHONE (OUTPATIENT)
Dept: OBSTETRICS AND GYNECOLOGY | Facility: CLINIC | Age: 24
End: 2024-08-19

## 2024-08-19 VITALS
SYSTOLIC BLOOD PRESSURE: 112 MMHG | BODY MASS INDEX: 19.28 KG/M2 | WEIGHT: 98.2 LBS | HEIGHT: 60 IN | DIASTOLIC BLOOD PRESSURE: 74 MMHG

## 2024-08-19 DIAGNOSIS — N94.10 FEMALE DYSPAREUNIA: Primary | ICD-10-CM

## 2024-08-19 DIAGNOSIS — N76.0 ACUTE VAGINITIS: ICD-10-CM

## 2024-08-19 PROCEDURE — 1160F RVW MEDS BY RX/DR IN RCRD: CPT | Performed by: NURSE PRACTITIONER

## 2024-08-19 PROCEDURE — 99213 OFFICE O/P EST LOW 20 MIN: CPT | Performed by: NURSE PRACTITIONER

## 2024-08-19 PROCEDURE — 1159F MED LIST DOCD IN RCRD: CPT | Performed by: NURSE PRACTITIONER

## 2024-08-19 RX ORDER — NYSTATIN AND TRIAMCINOLONE ACETONIDE 100000; 1 [USP'U]/G; MG/G
1 OINTMENT TOPICAL 2 TIMES DAILY
Qty: 15 G | Refills: 2 | Status: SHIPPED | OUTPATIENT
Start: 2024-08-19

## 2024-08-19 NOTE — TELEPHONE ENCOUNTER
Caller: Sugar Sequeira    Relationship: Self    Best call back number: [phone] (home)       What form or medical record are you requesting: SCHOOL NOTE    Who is requesting this form or medical record from you: [facility]     How would you like to receive the form or medical records (pick-up, mail, fax): MYCHART      Timeframe paperwork needed: ASAP    Additional notes: PT WAS SEEN TODAY, NEEDS SCHOOL NOTE REGARDING APPT TODAY.

## 2024-08-19 NOTE — PROGRESS NOTES
"Chief Complaint  Sugar Sequeira is a 23 y.o.  female presenting for Follow-up (Patient states that she has something present at the vaginal introitus.  Recent constipation, states that she had surgery approximately 4 months ago to remove ovarian cysts (\"endometriomas\")  and patient states that these were pressing against her bowel, causing issues with bowel evacuation.  ) and Dyspareunia (Feels that this is secondary to whatever is present at vaginal introitus. )    History of Present Illness  Sugar is a 22yo, nulligravid woman, here for a problem visit.  She feels a tender spot inside the labia at the vaginal introitus.  Sexual activity has been painful because of this little bump or tender place.  She has never had this before.  No new sexual partners.    Using condoms for contraception;  Current Tx for endometriosis with Lupron noted.      The following portions of the patient's history were reviewed and updated as appropriate: allergies, current medications, past family history, past medical history, past social history, past surgical history, and problem list.    No Known Allergies      Current Outpatient Medications:     leuprolide (Lupron Depot, 3-Month,) 11.25 MG injection, Inject 11.25 mg into the appropriate muscle as directed by prescriber Every 3 (Three) Months. This medication is to be administered in prescriber's office., Disp: 1 each, Rfl: 1    nystatin-triamcinolone (MYCOLOG) 617642-0.1 UNIT/GM-% ointment, Apply 1 Application topically to the appropriate area as directed 2 (Two) Times a Day., Disp: 15 g, Rfl: 2    Prenatal Vit-Fe Fumarate-FA (Prenatal Vitamin) 27-0.8 MG tablet, Take 1 tablet by mouth Daily., Disp: , Rfl:     Past Medical History:   Diagnosis Date    Bilateral endometriomas of ovary     Status post incision and drainage and ovarian cystectomies 2024        Past Surgical History:   Procedure Laterality Date    INCISIONAL BREAST BIOPSY Right 10/2022    LAPAROSCOPIC OVARIAN " "CYSTECTOMY Bilateral 04/17/2024    Drainage of endometriomas; lysis of adhesions; ovarian cystectomy; placement of Interceed    WISDOM TOOTH EXTRACTION  2016       Objective  /74   Ht 152.4 cm (60\")   Wt 44.5 kg (98 lb 3.2 oz)   Breastfeeding No   BMI 19.18 kg/m²     Physical Exam  Vitals and nursing note reviewed. Exam conducted with a chaperone present.   Constitutional:       General: She is not in acute distress.     Appearance: Normal appearance. She is not ill-appearing.   Abdominal:      Palpations: Abdomen is soft. There is no mass.      Tenderness: There is no abdominal tenderness.   Genitourinary:     General: Normal vulva.      Labia:         Right: No rash, tenderness or lesion.         Left: Tenderness present. No rash or lesion.       Vagina: No vaginal discharge or erythema.      Cervix: No cervical motion tenderness, discharge, lesion or erythema.      Uterus: Normal. Not enlarged and not tender.       Adnexa: Right adnexa normal and left adnexa normal.        Right: No mass or tenderness.          Left: No mass or tenderness.        Rectum: Normal.      Comments: No bumps or lesions noted.  Remnants of hymenal ring around the vaginal introitus, and an abrasion at the 2:00 position, which is where pt perceives the tender spot.  Sl erythema of the labia minora.  Skin:     General: Skin is warm and dry.   Neurological:      Mental Status: She is alert and oriented to person, place, and time.   Psychiatric:         Mood and Affect: Mood normal.         Behavior: Behavior normal.         Assessment/Plan   Diagnoses and all orders for this visit:    1. Female dyspareunia (Primary)    2. Acute vaginitis    Other orders  -     nystatin-triamcinolone (MYCOLOG) 884279-9.1 UNIT/GM-% ointment; Apply 1 Application topically to the appropriate area as directed 2 (Two) Times a Day.  Dispense: 15 g; Refill: 2    Couns re: santos care.    Procedures    19 to 39: Counseling/Anticipatory Guidance Discussed: " santos care     Return if symptoms worsen or fail to improve.    Prerna Robison, APRN  08/19/2024

## 2024-08-26 NOTE — TELEPHONE ENCOUNTER
Caller: Sugar Sequeira    Relationship: Self    Best call back number: [phone]    What is the best time to reach you: CALLING BACK TO REQUEST SCHOOL EXCUSE    Who are you requesting to speak with (clinical staff, provider,  specific staff member): N/A    Do you know the name of the person who called: N/A    What was the call regarding: SCHOOL EXCUSE    Is it okay if the provider responds through MyChart: YES      PATIENT CALLING BACK TO CHECK ON REQUEST FOR NOTE FOR SCHOOL EXCUSE FROM ABIGAIL DE LEON FOR APPT ON MONDAY 8/19/24 -     PLEASE ADD LETTER TO MYCHART

## 2024-08-26 NOTE — TELEPHONE ENCOUNTER
Letter has been created.  Called patient to inform her of this. No answer, left detailed VM informing of letter being created.   Verb Release okayed.

## 2024-11-15 ENCOUNTER — OFFICE VISIT (OUTPATIENT)
Dept: OBSTETRICS AND GYNECOLOGY | Facility: CLINIC | Age: 24
End: 2024-11-15
Payer: COMMERCIAL

## 2024-11-15 VITALS — RESPIRATION RATE: 14 BRPM | BODY MASS INDEX: 19.33 KG/M2 | WEIGHT: 99 LBS

## 2024-11-15 DIAGNOSIS — N80.129 ENDOMETRIOMA OF OVARY: Primary | ICD-10-CM

## 2024-11-15 DIAGNOSIS — Z31.69 ENCOUNTER FOR PRECONCEPTION CONSULTATION: ICD-10-CM

## 2024-11-15 PROCEDURE — 99213 OFFICE O/P EST LOW 20 MIN: CPT | Performed by: OBSTETRICS & GYNECOLOGY

## 2024-11-15 NOTE — PROGRESS NOTES
Subjective   Chief Complaint   Patient presents with    Follow-up     U/s       Sugar Sequeira is a 24 y.o. year old .  No LMP recorded (lmp unknown).  She comes back in follow-up.  She has completed roughly 6 months of Lupron.  She feels fine overall.  Not having menses.  Having no pain of any significance.  They are currently sexually active would like to proceed with trying to get pregnant as soon as I think it is safe    The following portions of the patient's history were reviewed and updated as appropriate:current medications and allergies    Social History    Tobacco Use      Smoking status: Never      Smokeless tobacco: Never         Objective   Resp 14   Wt 44.9 kg (99 lb)   LMP  (LMP Unknown)   BMI 19.33 kg/m²     Lab Review   No data reviewed    Imaging   Ultrasound shows probable endometriomas from both right and left ovary along with probable pelvic adhesions with the ovaries stuck in the cul-de-sac closer to the midline.  As compared to the prior study, both left and right ovary are markedly smaller       Assessment   Probable residual stage IV endometriosis, currently asymptomatic  Preconceptional on prenatal vitamins     Plan   Okay to begin to conceive  Ectopic warnings discussed and if she were to conceive we need to hear from her soon as she is at increased risk for ectopic gestation  The importance of keeping all planned follow-up and taking all medications as prescribed was emphasized.  Follow up PRN           This note was electronically signed.    Carlos Mckenzie M.D.  November 15, 2024      Part of this note may be an electronic transcription/translation of spoken language to printed text using the Dragon Dictation System.

## 2025-02-10 ENCOUNTER — OFFICE VISIT (OUTPATIENT)
Dept: OBSTETRICS AND GYNECOLOGY | Facility: CLINIC | Age: 25
End: 2025-02-10
Payer: COMMERCIAL

## 2025-02-10 ENCOUNTER — LAB (OUTPATIENT)
Dept: LAB | Facility: HOSPITAL | Age: 25
End: 2025-02-10
Payer: COMMERCIAL

## 2025-02-10 VITALS — BODY MASS INDEX: 19.73 KG/M2 | RESPIRATION RATE: 14 BRPM | WEIGHT: 101 LBS

## 2025-02-10 DIAGNOSIS — Z32.00 UNCONFIRMED PREGNANCY: ICD-10-CM

## 2025-02-10 DIAGNOSIS — Z32.00 UNCONFIRMED PREGNANCY: Primary | ICD-10-CM

## 2025-02-10 LAB — HCG INTACT+B SERPL-ACNC: 107 MIU/ML

## 2025-02-10 PROCEDURE — 36415 COLL VENOUS BLD VENIPUNCTURE: CPT

## 2025-02-10 PROCEDURE — 84702 CHORIONIC GONADOTROPIN TEST: CPT

## 2025-02-10 NOTE — PROGRESS NOTES
Subjective   Chief Complaint   Patient presents with    Follow-up       Sugar Sequeira is a 24 y.o. year old .  Patient's last menstrual period was 01/15/2025 (approximate).  She had a positive urine pregnancy test.  She comes in today because of her history of stage IV endometriosis.  She has had no issues with bleeding or pain at all.    The following portions of the patient's history were reviewed and updated as appropriate:current medications and allergies    Social History    Tobacco Use      Smoking status: Never      Smokeless tobacco: Never         Objective   Resp 14   Wt 45.8 kg (101 lb)   LMP 01/15/2025 (Approximate)   BMI 19.73 kg/m²     Lab Review   No data reviewed    Imaging   Ultrasound shows an anteflexed uterus with a thickened endometrial echocomplex but no evidence of a intrauterine pregnancy.  Adnexa show extensive endometrioma but no definitive sign of an ectopic pregnancy       Assessment   Pregnancy of unknown location and patient at increased risk for ectopic pregnancy     Plan   The following tests were ordered today: HCG.  It was explained to Heaven that all lab test should be back within the one week after they are performed. She will be notified about the results, regardless of the findings. If she has not been contacted by the office within 2 weeks after the test has been performed, it is her responsibility to contact us to learn about her results.  The importance of keeping all planned follow-up and taking all medications as prescribed was emphasized.  Follow up pending results of initial lab work  Ectopic warnings reviewed            This note was electronically signed.    Carlos Mckenzie M.D.  February 10, 2025    Total time spent today with Sugar  was 20 minutes (level 3) - pathophysiology of her presenting problem along with plans for any diagnositc work-up and treatment.  The time reported only includes face-to-face time and excludes any procedural based activities  that occurred on the same date of service.    Part of this note may be an electronic transcription/translation of spoken language to printed text using the Dragon Dictation System.

## 2025-02-12 ENCOUNTER — LAB (OUTPATIENT)
Dept: LAB | Facility: HOSPITAL | Age: 25
End: 2025-02-12
Payer: COMMERCIAL

## 2025-02-12 DIAGNOSIS — Z32.00 UNCONFIRMED PREGNANCY: ICD-10-CM

## 2025-02-12 PROCEDURE — 36415 COLL VENOUS BLD VENIPUNCTURE: CPT

## 2025-02-12 PROCEDURE — 84702 CHORIONIC GONADOTROPIN TEST: CPT

## 2025-02-13 DIAGNOSIS — N80.129 ENDOMETRIOMA OF OVARY: ICD-10-CM

## 2025-02-13 DIAGNOSIS — Z32.00 UNCONFIRMED PREGNANCY: Primary | ICD-10-CM

## 2025-02-13 LAB — HCG INTACT+B SERPL-ACNC: 387 MIU/ML
